# Patient Record
(demographics unavailable — no encounter records)

---

## 2025-05-28 NOTE — PHYSICAL EXAM
[Appropriately responsive] : appropriately responsive [Alert] : alert [No Acute Distress] : no acute distress [Oriented x3] : oriented x3 [FreeTextEntry2] :  Eduardo Morris  [FreeTextEntry1] : geovani

## 2025-05-28 NOTE — HISTORY OF PRESENT ILLNESS
[Patient reported mammogram was normal] : Patient reported mammogram was normal [Patient reported breast sonogram was normal] : Patient reported breast sonogram was normal [FreeTextEntry1] : ASHISH BLANCA 53 year old  LMP  presents for HRT f/u and DEXA  Today's DEXA - osteoporosis  Pt reports decreased fatigue but denies significant change in sxs since starting vivelle dot and oral progesterone 100mg. Sxs include hot flashes, vaginal dryness, dyspareunia, insomnia, low libido, and weight gain.  She sees nephrologist Dr. Tinoco for h/o glomerulonephritis s/p kidney transplant, last appt was in 2025. Pt does not follow w/ transplant team or a cardiologist.  OBHx:   x4 - 2005, 2006, ,   GYNhx: denies PMH: H/o Glomerulonephritis, HTN, hypothyroidism, anxiety, osteopenia, h/o resolved hep B  PSH: kidney transplant ,  due to Glomerulonephritis Med: Synthroid, HTCZ, amlodipine  All: NKDA Soc: social alcohol, No drug, or tobacco use, non-smoker. pt is a nurse anesthetist.  Psych: denies FamHx: Mother: breast cancer onset 76 (BRCA negative) Denies ovarian, uterine, or colon ca PHQ9=5 [Mammogramdate] : 10/2024 [BreastSonogramDate] : 10/2024 [BoneDensityDate] : 5/2025 [TextBox_37] : osteoporosis

## 2025-05-28 NOTE — PLAN
[FreeTextEntry1] : #HRT, h/o Glomerulonephritis and kidney transplant x2: -I spoke with nephrologist Dr. Tinoco - there is no contraindication to increasing dose as pt is not on any immunosuppressant -Rx given for estrogen patch 0.0375, increased from 0.025 - d/w pt r/b/a -c/w progesterone 100mg -pt to call as needed or schedule telemed in about 1 month if no improvement in sxs  #Osteoporosis -Today's DEXA showed osteoporosis -D/w pt increased calcium in diet & Vit D 1000 U intake, & weight-bearing exercise (i.e. walking) for 30 min daily.  -Advised pt to see endocrinologist to manage osteoporosis - referrals given   RTO WALI Beck MD